# Patient Record
Sex: MALE | Race: OTHER | HISPANIC OR LATINO | URBAN - METROPOLITAN AREA
[De-identification: names, ages, dates, MRNs, and addresses within clinical notes are randomized per-mention and may not be internally consistent; named-entity substitution may affect disease eponyms.]

---

## 2020-01-12 ENCOUNTER — EMERGENCY (EMERGENCY)
Facility: HOSPITAL | Age: 65
LOS: 1 days | Discharge: HOME | End: 2020-01-12
Admitting: EMERGENCY MEDICINE
Payer: MEDICARE

## 2020-01-12 VITALS
TEMPERATURE: 99 F | WEIGHT: 274.92 LBS | DIASTOLIC BLOOD PRESSURE: 109 MMHG | RESPIRATION RATE: 18 BRPM | SYSTOLIC BLOOD PRESSURE: 183 MMHG | HEIGHT: 70 IN | HEART RATE: 100 BPM | OXYGEN SATURATION: 92 %

## 2020-01-12 VITALS
SYSTOLIC BLOOD PRESSURE: 173 MMHG | RESPIRATION RATE: 17 BRPM | HEART RATE: 100 BPM | OXYGEN SATURATION: 95 % | DIASTOLIC BLOOD PRESSURE: 100 MMHG

## 2020-01-12 PROCEDURE — 70360 X-RAY EXAM OF NECK: CPT | Mod: 26

## 2020-01-12 PROCEDURE — 99283 EMERGENCY DEPT VISIT LOW MDM: CPT | Mod: 25

## 2020-01-12 RX ORDER — DIAZEPAM 5 MG
5 TABLET ORAL ONCE
Refills: 0 | Status: DISCONTINUED | OUTPATIENT
Start: 2020-01-12 | End: 2020-01-12

## 2020-01-12 RX ADMIN — Medication 5 MILLIGRAM(S): at 12:15

## 2020-01-12 NOTE — ED ADULT NURSE REASSESSMENT NOTE - NS ED NURSE REASSESS COMMENT FT1
Patient provided with juice for PO challenge, after drinking pt with episode of choking states he feels like the meat is still there. No difficulty breathing. STEVAN Lemos made aware, will reassess.
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
## 2020-01-12 NOTE — ED PROVIDER NOTE - NSFOLLOWUPINSTRUCTIONS_ED_ALL_ED_FT
please chew food and eat in smaller portions with plenty of fluids     please take your blood pressure medication upon your return home.     Choking, Adult  Choking occurs when a food or object gets stuck in the throat or windpipe (trachea) and blocks the airway. If the airway is partly blocked, coughing will usually cause the food or object to come out. If the airway is completely blocked, immediate action is needed to make it come out.  A person who is choking needs immediate help. The kind of treatment you offer depends on the severity of the person's choking. A person has a partial airway blockage if he or she is coughing but is able to speak. A person has a complete airway blockage if he or she:  Is unable to breathe.Is making soft or high-pitched sounds while breathing.Is unable to cough or is coughing weakly, ineffectively, or silently.Is unable to cry, speak, or make sounds.Is turning blue.Is holding the neck with both hands. This is the universal sign of choking.Nods "yes" when asked if he or she is choking.Follow these instructions at home:  For partial airway blockage     If a person has a partial airway blockage and he or she is coughing and is able to speak:  Do not interfere. Allow coughing to clear the airway.Do not let him or her try to drink until the food or object comes out.Stay with the person until the food or object comes out. Watch for signs of choking (complete airway blockage). If the person shows signs of complete airway blockage you should take action to help the person.For complete airway blockage    If a person has a complete airway blockage, his or her life is in danger. Choking causes breathing to stop. This is a medical emergency that requires fast, appropriate action by anyone who is available.  To save a person who is choking:  Encourage the person to cough. If this does not relieve the blockage, go to Step 2.  Perform a Heimlich maneuver. The Heimlich maneuver, also called abdominal thrusts, forces air from the abdomen to go up into the throat to relieve a blockage.  Heimlich maneuver for choking    For a person who is sitting or standing:  Ask the person whether he or she is choking. If the person nods or gives the universal sign of choking, continue to step 2.  Shout for help. If someone responds, tell that person to call local emergency services (371 in the [place] He or she should use a landline, if possible.  Stand or kneel behind the person who is choking and lean him or her forward slightly.  Make a fist with one hand, put your arms around the person's midsection, and grasp your fist with your other hand. Place the thumb side of your fist against the person's abdomen, just below the ribs.  Quickly press inward (toward you) and upward with both hands. Repeat this 3–5 times.  If the person is pregnant or obese and you cannot wrap your arms around the person's midsection, wrap your arms around the chest (under the armpits), place your hands over the breastbone, and do chest thrusts.Repeat this maneuver until the object comes out and the person is able to breathe, or until the person becomes unconscious.  Anyone who has been given the Heimlich maneuver should be seen by a health care provider after the event.  For a person who is unconscious (if the choking person collapses or is found on the ground and you know the person was choking):  Shout for help.  If someone responds, tell that person to call local emergency services (261 in [place] and look for an AED. He or she should use a landline, if possible.If no one responds, call local emergency services (611 in the [place] yourself, if possible.Make sure the person is lying on a firm, flat surface, facing up. Begin CPR, starting with 30 compressions and 2 breaths. Every time you open the airway to give rescue breaths, open the person's mouth. If you can see the food or object and it can be easily pulled out, remove it with your fingers.  After 5 cycles or 2 minutes of CPR, call local emergency services (067 in [place] if a call has not already been made.  Continue CPR until the person starts breathing or until help arrives.  If you are choking:      Call local emergency services (041 in [place] Use a landline if possible. Do not worry about communicating what is happening. Do not hang up the phone. Someone may be sent to help you anyway.  Make a fist with one hand. Put the thumb side of the fist against your abdomen, just above the belly button and well below the breastbone. If you are pregnant or obese, put your fist on your chest instead, just below the breastbone and just above your lowest ribs.  Hold your fist with your other hand and bend over a hard surface, such as a table or chair.  Forcefully push your fist in and up.  Continue to do this until the food or object comes out.  Prevention    Chew food thoroughly.Avoid talking while you are chewing and swallowing.To be prepared if choking occurs, take a certified first-aid course to learn how to correctly perform the Heimlich maneuver.  Contact a health care provider if:  You have trouble swallowing food.Get help right away if you:  Develop a fever after choking stops.Have problems breathing after choking stops.Were given the Heimlich maneuver.Summary  Choking occurs when a food or object gets stuck in the throat (trachea) and blocks the airway.If a person has a partial airway blockage, do not interfere and do not allow the person to drink until the food or object comes out.If a person has a complete airway blockage perform the Heimlich maneuver. Call local emergency services or take the person to a health care provider afterward.If the person is unconscious, call local emergency services and perform CPR until the person breathes normally or until help arrives.This information is not intended to replace advice given to you by your health care provider. Make sure you discuss any questions you have with your health care provider.    Document Released: 01/25/2006 Document Revised: 01/09/2019 Document Reviewed: 01/09/2019  [employer] Interactive Patient Education © 2019 [employer] please chew food and eat in smaller portions with plenty of fluids     please take your blood pressure medication upon your return home.     please follow up with gastroenterologist for the choking you experienced today    Choking, Adult  Choking occurs when a food or object gets stuck in the throat or windpipe (trachea) and blocks the airway. If the airway is partly blocked, coughing will usually cause the food or object to come out. If the airway is completely blocked, immediate action is needed to make it come out.  A person who is choking needs immediate help. The kind of treatment you offer depends on the severity of the person's choking. A person has a partial airway blockage if he or she is coughing but is able to speak. A person has a complete airway blockage if he or she:  Is unable to breathe.Is making soft or high-pitched sounds while breathing.Is unable to cough or is coughing weakly, ineffectively, or silently.Is unable to cry, speak, or make sounds.Is turning blue.Is holding the neck with both hands. This is the universal sign of choking.Nods "yes" when asked if he or she is choking.Follow these instructions at home:  For partial airway blockage     If a person has a partial airway blockage and he or she is coughing and is able to speak:  Do not interfere. Allow coughing to clear the airway.Do not let him or her try to drink until the food or object comes out.Stay with the person until the food or object comes out. Watch for signs of choking (complete airway blockage). If the person shows signs of complete airway blockage you should take action to help the person.For complete airway blockage    If a person has a complete airway blockage, his or her life is in danger. Choking causes breathing to stop. This is a medical emergency that requires fast, appropriate action by anyone who is available.  To save a person who is choking:  Encourage the person to cough. If this does not relieve the blockage, go to Step 2.  Perform a Heimlich maneuver. The Heimlich maneuver, also called abdominal thrusts, forces air from the abdomen to go up into the throat to relieve a blockage.  Heimlich maneuver for choking    For a person who is sitting or standing:  Ask the person whether he or she is choking. If the person nods or gives the universal sign of choking, continue to step 2.  Shout for help. If someone responds, tell that person to call local emergency services (911 in the [place] He or she should use a landline, if possible.  Stand or kneel behind the person who is choking and lean him or her forward slightly.  Make a fist with one hand, put your arms around the person's midsection, and grasp your fist with your other hand. Place the thumb side of your fist against the person's abdomen, just below the ribs.  Quickly press inward (toward you) and upward with both hands. Repeat this 3–5 times.  If the person is pregnant or obese and you cannot wrap your arms around the person's midsection, wrap your arms around the chest (under the armpits), place your hands over the breastbone, and do chest thrusts.Repeat this maneuver until the object comes out and the person is able to breathe, or until the person becomes unconscious.  Anyone who has been given the Heimlich maneuver should be seen by a health care provider after the event.  For a person who is unconscious (if the choking person collapses or is found on the ground and you know the person was choking):  Shout for help.  If someone responds, tell that person to call local emergency services (911 in [place] and look for an AED. He or she should use a landline, if possible.If no one responds, call local emergency services (101 in the [place] yourself, if possible.Make sure the person is lying on a firm, flat surface, facing up. Begin CPR, starting with 30 compressions and 2 breaths. Every time you open the airway to give rescue breaths, open the person's mouth. If you can see the food or object and it can be easily pulled out, remove it with your fingers.  After 5 cycles or 2 minutes of CPR, call local emergency services (341 in [place] if a call has not already been made.  Continue CPR until the person starts breathing or until help arrives.  If you are choking:      Call local emergency services (581 in [place] Use a landline if possible. Do not worry about communicating what is happening. Do not hang up the phone. Someone may be sent to help you anyway.  Make a fist with one hand. Put the thumb side of the fist against your abdomen, just above the belly button and well below the breastbone. If you are pregnant or obese, put your fist on your chest instead, just below the breastbone and just above your lowest ribs.  Hold your fist with your other hand and bend over a hard surface, such as a table or chair.  Forcefully push your fist in and up.  Continue to do this until the food or object comes out.  Prevention    Chew food thoroughly.Avoid talking while you are chewing and swallowing.To be prepared if choking occurs, take a certified first-aid course to learn how to correctly perform the Heimlich maneuver.  Contact a health care provider if:  You have trouble swallowing food.Get help right away if you:  Develop a fever after choking stops.Have problems breathing after choking stops.Were given the Heimlich maneuver.Summary  Choking occurs when a food or object gets stuck in the throat (trachea) and blocks the airway.If a person has a partial airway blockage, do not interfere and do not allow the person to drink until the food or object comes out.If a person has a complete airway blockage perform the Heimlich maneuver. Call local emergency services or take the person to a health care provider afterward.If the person is unconscious, call local emergency services and perform CPR until the person breathes normally or until help arrives.This information is not intended to replace advice given to you by your health care provider. Make sure you discuss any questions you have with your health care provider.    Document Released: 01/25/2006 Document Revised: 01/09/2019 Document Reviewed: 01/09/2019  Elsevier Interactive Patient Education © 2019 [employer]

## 2020-01-12 NOTE — ED PROVIDER NOTE - CARE PROVIDER_API CALL
Varsha Pickering (DO)  Internal Medicine; Preventive Medicine  [address] [place] [address]  Phone: [phone]  Fax: [phone]  Follow Up Time:     Basilio Sanchez; MBBS)  Internal Medicine  [address] [place] [address]  Phone: [phone]  Fax: [phone]  Follow Up Time:

## 2020-01-12 NOTE — ED PROVIDER NOTE - PHYSICAL EXAMINATION
General: Patient is well developed and well nourised. Patient is alert and oriented to person, place and date. Patient is laying comfortably in stretcher and appears in no acute distress.  HEENT: Head is normocephalic and atraumatic. Pupils are equal, round and reactive. Extraocular movements intact. No evidence of nystagmus, conjunctival injection, or scleral icterus. External ears symmetric without evidence of discharge.  Nose is symmetric, non-tender, patent without evidence of discharge. Teeth in good repair. Uvula midline.   Neck: Supple with no evidence of lymphadenopathy.  Full range of motion.  Heart: Regular rate and rhythm. No murmurs, rubs or gallops.   Lungs: Clear to auscultation bilaterally with equal chest expansion. No note of wheezes, rhonchi, rales. Equal chest expansion. No note of retractions.  Abdomen: Bowel sounds present in all four quadrants. Soft, non-tender, non-distended without signs of masses, rebound or guarding. No note of hepatosplenomegaly. No CVA tenderness bilaterally. Negative Hollingsworth sign. No pain present over McBurney's point.  Neuro: 15. Moving all extremities without discomfort. Strength is 5/5 arms and legs bilaterally. Sensation intact in all four extremities. gait steady   Skin: Warm, dry and intact without evidence of rashes, bruising, pallor, jaundice or cyanosis.   Psych: Mood and affect appropriate.
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
## 2020-01-12 NOTE — ED PROVIDER NOTE - CARE PROVIDERS DIRECT ADDRESSES
,DirectAddress_Unknown,[email or web address]
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
## 2020-01-12 NOTE — ED PROVIDER NOTE - PATIENT PORTAL LINK FT
You can access the FollowMyHealth Patient Portal offered by [facility] by registering at the following website: [email or web address]. By joining [facility]’s FollowMyHealth portal, you will also be able to view your health information using other applications (apps) compatible with our system. You can access the FollowMyHealth Patient Portal offered by [facility] by registering at the following website: [email or web address]. By joining [employer]’s FollowMyHealth portal, you will also be able to view your health information using other applications (apps) compatible with our system.

## 2020-01-12 NOTE — ED PROVIDER NOTE - DIAGNOSTIC INTERPRETATION
ER Physician Assistant: reviewed with radiology  INTERPRETATION: no acute fracture; no soft tissue swelling noted; normal bony alignment. no noted foreign body

## 2020-01-14 ENCOUNTER — INPATIENT (INPATIENT)
Facility: HOSPITAL | Age: 65
LOS: 1 days | Discharge: HOME | DRG: 395 | End: 2020-01-16
Attending: HOSPITALIST | Admitting: HOSPITALIST
Payer: MEDICARE

## 2020-01-14 VITALS
WEIGHT: 268.08 LBS | DIASTOLIC BLOOD PRESSURE: 138 MMHG | RESPIRATION RATE: 16 BRPM | OXYGEN SATURATION: 94 % | HEART RATE: 98 BPM | HEIGHT: 70 IN | TEMPERATURE: 98 F | SYSTOLIC BLOOD PRESSURE: 206 MMHG

## 2020-01-14 DIAGNOSIS — Z91.89 OTHER SPECIFIED PERSONAL RISK FACTORS, NOT ELSEWHERE CLASSIFIED: ICD-10-CM

## 2020-01-14 DIAGNOSIS — R13.10 DYSPHAGIA, UNSPECIFIED: ICD-10-CM

## 2020-01-14 DIAGNOSIS — G89.29 OTHER CHRONIC PAIN: ICD-10-CM

## 2020-01-14 DIAGNOSIS — I10 ESSENTIAL (PRIMARY) HYPERTENSION: ICD-10-CM

## 2020-01-14 DIAGNOSIS — I16.0 HYPERTENSIVE URGENCY: ICD-10-CM

## 2020-01-14 DIAGNOSIS — R63.8 OTHER SYMPTOMS AND SIGNS CONCERNING FOOD AND FLUID INTAKE: ICD-10-CM

## 2020-01-14 DIAGNOSIS — R94.31 ABNORMAL ELECTROCARDIOGRAM [ECG] [EKG]: ICD-10-CM

## 2020-01-14 LAB
ALBUMIN SERPL ELPH-MCNC: 4.2 G/DL — SIGNIFICANT CHANGE UP (ref 3.3–5)
ALP SERPL-CCNC: 100 U/L — SIGNIFICANT CHANGE UP (ref 40–120)
ALT FLD-CCNC: 17 U/L — SIGNIFICANT CHANGE UP (ref 10–45)
ANION GAP SERPL CALC-SCNC: 17 MMOL/L — SIGNIFICANT CHANGE UP (ref 5–17)
AST SERPL-CCNC: 31 U/L — SIGNIFICANT CHANGE UP (ref 10–40)
BASOPHILS # BLD AUTO: 0.06 K/UL — SIGNIFICANT CHANGE UP (ref 0–0.2)
BASOPHILS NFR BLD AUTO: 0.5 % — SIGNIFICANT CHANGE UP (ref 0–2)
BILIRUB SERPL-MCNC: 0.8 MG/DL — SIGNIFICANT CHANGE UP (ref 0.2–1.2)
BUN SERPL-MCNC: 9 MG/DL — SIGNIFICANT CHANGE UP (ref 7–23)
CALCIUM SERPL-MCNC: 9.4 MG/DL — SIGNIFICANT CHANGE UP (ref 8.4–10.5)
CHLORIDE SERPL-SCNC: 103 MMOL/L — SIGNIFICANT CHANGE UP (ref 96–108)
CK MB CFR SERPL CALC: 8.6 NG/ML — HIGH (ref 0–6.7)
CK MB CFR SERPL CALC: 9.1 NG/ML — HIGH (ref 0–6.7)
CK SERPL-CCNC: 729 U/L — HIGH (ref 30–200)
CK SERPL-CCNC: 877 U/L — HIGH (ref 30–200)
CO2 SERPL-SCNC: 24 MMOL/L — SIGNIFICANT CHANGE UP (ref 22–31)
CREAT SERPL-MCNC: 0.8 MG/DL — SIGNIFICANT CHANGE UP (ref 0.5–1.3)
EOSINOPHIL # BLD AUTO: 0.45 K/UL — SIGNIFICANT CHANGE UP (ref 0–0.5)
EOSINOPHIL NFR BLD AUTO: 3.7 % — SIGNIFICANT CHANGE UP (ref 0–6)
GLUCOSE BLDC GLUCOMTR-MCNC: 84 MG/DL — SIGNIFICANT CHANGE UP (ref 70–99)
GLUCOSE SERPL-MCNC: 99 MG/DL — SIGNIFICANT CHANGE UP (ref 70–99)
HCT VFR BLD CALC: 48.2 % — SIGNIFICANT CHANGE UP (ref 39–50)
HGB BLD-MCNC: 15.2 G/DL — SIGNIFICANT CHANGE UP (ref 13–17)
IMM GRANULOCYTES NFR BLD AUTO: 0.6 % — SIGNIFICANT CHANGE UP (ref 0–1.5)
LYMPHOCYTES # BLD AUTO: 1.67 K/UL — SIGNIFICANT CHANGE UP (ref 1–3.3)
LYMPHOCYTES # BLD AUTO: 13.8 % — SIGNIFICANT CHANGE UP (ref 13–44)
MCHC RBC-ENTMCNC: 30 PG — SIGNIFICANT CHANGE UP (ref 27–34)
MCHC RBC-ENTMCNC: 31.5 GM/DL — LOW (ref 32–36)
MCV RBC AUTO: 95.1 FL — SIGNIFICANT CHANGE UP (ref 80–100)
MONOCYTES # BLD AUTO: 0.97 K/UL — HIGH (ref 0–0.9)
MONOCYTES NFR BLD AUTO: 8 % — SIGNIFICANT CHANGE UP (ref 2–14)
NEUTROPHILS # BLD AUTO: 8.91 K/UL — HIGH (ref 1.8–7.4)
NEUTROPHILS NFR BLD AUTO: 73.4 % — SIGNIFICANT CHANGE UP (ref 43–77)
NRBC # BLD: 0 /100 WBCS — SIGNIFICANT CHANGE UP (ref 0–0)
PLATELET # BLD AUTO: 196 K/UL — SIGNIFICANT CHANGE UP (ref 150–400)
POTASSIUM SERPL-MCNC: 4.4 MMOL/L — SIGNIFICANT CHANGE UP (ref 3.5–5.3)
POTASSIUM SERPL-SCNC: 4.4 MMOL/L — SIGNIFICANT CHANGE UP (ref 3.5–5.3)
PROT SERPL-MCNC: 8.2 G/DL — SIGNIFICANT CHANGE UP (ref 6–8.3)
RBC # BLD: 5.07 M/UL — SIGNIFICANT CHANGE UP (ref 4.2–5.8)
RBC # FLD: 12.9 % — SIGNIFICANT CHANGE UP (ref 10.3–14.5)
SODIUM SERPL-SCNC: 144 MMOL/L — SIGNIFICANT CHANGE UP (ref 135–145)
TROPONIN T SERPL-MCNC: 0.01 NG/ML — SIGNIFICANT CHANGE UP (ref 0–0.01)
TROPONIN T SERPL-MCNC: 0.02 NG/ML — HIGH (ref 0–0.01)
WBC # BLD: 12.13 K/UL — HIGH (ref 3.8–10.5)
WBC # FLD AUTO: 12.13 K/UL — HIGH (ref 3.8–10.5)

## 2020-01-14 PROCEDURE — 99285 EMERGENCY DEPT VISIT HI MDM: CPT

## 2020-01-14 PROCEDURE — 70490 CT SOFT TISSUE NECK W/O DYE: CPT | Mod: 26

## 2020-01-14 PROCEDURE — 99222 1ST HOSP IP/OBS MODERATE 55: CPT | Mod: GC

## 2020-01-14 RX ORDER — GLUCAGON INJECTION, SOLUTION 0.5 MG/.1ML
0.5 INJECTION, SOLUTION SUBCUTANEOUS ONCE
Refills: 0 | Status: COMPLETED | OUTPATIENT
Start: 2020-01-14 | End: 2020-01-14

## 2020-01-14 RX ORDER — HYDRALAZINE HCL 50 MG
10 TABLET ORAL ONCE
Refills: 0 | Status: DISCONTINUED | OUTPATIENT
Start: 2020-01-14 | End: 2020-01-14

## 2020-01-14 RX ORDER — PANTOPRAZOLE SODIUM 20 MG/1
40 TABLET, DELAYED RELEASE ORAL EVERY 12 HOURS
Refills: 0 | Status: DISCONTINUED | OUTPATIENT
Start: 2020-01-14 | End: 2020-01-16

## 2020-01-14 RX ORDER — LABETALOL HCL 100 MG
20 TABLET ORAL ONCE
Refills: 0 | Status: DISCONTINUED | OUTPATIENT
Start: 2020-01-14 | End: 2020-01-14

## 2020-01-14 RX ORDER — ONDANSETRON 8 MG/1
4 TABLET, FILM COATED ORAL EVERY 8 HOURS
Refills: 0 | Status: DISCONTINUED | OUTPATIENT
Start: 2020-01-14 | End: 2020-01-16

## 2020-01-14 RX ORDER — ONDANSETRON 8 MG/1
4 TABLET, FILM COATED ORAL ONCE
Refills: 0 | Status: COMPLETED | OUTPATIENT
Start: 2020-01-14 | End: 2020-01-14

## 2020-01-14 RX ORDER — METHADONE HYDROCHLORIDE 40 MG/1
5 TABLET ORAL EVERY 8 HOURS
Refills: 0 | Status: DISCONTINUED | OUTPATIENT
Start: 2020-01-14 | End: 2020-01-15

## 2020-01-14 RX ADMIN — PANTOPRAZOLE SODIUM 40 MILLIGRAM(S): 20 TABLET, DELAYED RELEASE ORAL at 22:30

## 2020-01-14 RX ADMIN — GLUCAGON INJECTION, SOLUTION 0.5 MILLIGRAM(S): 0.5 INJECTION, SOLUTION SUBCUTANEOUS at 17:32

## 2020-01-14 RX ADMIN — ONDANSETRON 4 MILLIGRAM(S): 8 TABLET, FILM COATED ORAL at 17:32
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
## 2020-01-14 NOTE — H&P ADULT - NSHPLABSRESULTS_GEN_ALL_CORE
.  LABS:                         15.2   12.13 )-----------( 196      ( 14 Jan 2020 15:02 )             48.2     01-14    144  |  103  |  9   ----------------------------<  99  4.4   |  24  |  0.80    Ca    9.4      14 Jan 2020 15:02    TPro  8.2  /  Alb  4.2  /  TBili  0.8  /  DBili  x   /  AST  31  /  ALT  17  /  AlkPhos  100  01-14        RADIOLOGY, EKG & ADDITIONAL TESTS: Reviewed.     Xray Neck Soft Tissue (01.12.20 @ 12:47)   IMPRESSION: No radiopaque foreign body.    CT Neck Soft Tissue No Cont (01.14.20 @ 16:43)   IMPRESSION:  No foreign body identified in the upper aerodigestive tract.    EKG: NSR, normal axis, TWI in inferior leads, Q waves V1-3

## 2020-01-14 NOTE — H&P ADULT - PROBLEM SELECTOR PLAN 1
Two day history of dysphagia with sensation of food stuck in the throat after eating pork chops. Negative ENT scope, negative Xray ad CT imaging. No concerning features on exam, as patient is able to handle secretions, and in no respiratory distress.   - GI consulted, plan for EGD  - NPO after MN, coags and T&S  - Zofran PRN for nausea  - hold off on maintenance fluids for now given hypertension  - RCRI Score 0% (1% if has congestive heart failure)  - No hx of adverse reaction to anesthesia Two day history of dysphagia with sensation of food stuck in the throat after eating pork chops. Negative ENT scope, negative Xray ad CT imaging. No concerning features on exam, as patient is able to handle secretions, and in no respiratory distress.   - GI consulted, plan for EGD tomorrow  - PPI IV BID  - NPO after MN, coags and T&S  - Zofran PRN for nausea  - rule out external compression from aortic dissection or aneurysm with urgent CT Angio chest dissection protocol  - BP measurements in both arms  - RCRI Score 0% (1% if has congestive heart failure)  - No hx of adverse reaction to anesthesia

## 2020-01-14 NOTE — ED PROVIDER NOTE - PHYSICAL EXAMINATION
L anterior neck swelling, soft, non-tender, no overlying skin changes - pt states this is chronic and unchanged.  PT points to area just superior and to the right of suprasternal notch as area of FB sensation.  normal swallowing.  no stridor/drooling/voice changes.   LUE contracted/weak (chronic)  steady gait w/ cane like at baseline

## 2020-01-14 NOTE — CONSULT NOTE ADULT - ASSESSMENT
65 yo M with HTN, chronic pain, on methadone who comes in with two day history of dysphagia. 65 yo M with HTN, chronic pain, on methadone who comes in with two day history of dysphagia  -CT neck negative. Pt able to tolerate secretions and drink water at bedside   -Differential includes web vs stricture vs esophagitis. No alarm features to suggest carcinoma. Motility etiologies such as achalasia less likely given no prior history of dysphagia.   I-Recommend IV PPI BID  -Keep NPO, with plan for EGD tomorrow to further evaluate    Discussed case with Dr. Hernandez 63 yo M with HTN, chronic pain, on methadone who comes in with two day history of dysphagia  -CT neck negative. Pt able to tolerate secretions and drink water at bedside  -Differential includes web vs stricture vs esophagitis. No alarm features to suggest carcinoma. Motility etiologies such as achalasia less likely given no prior history of dysphagia.   -Recommend IV PPI BID  -Keep NPO, with plan for EGD tomorrow to further evaluate    Discussed case with Dr. Hernandez

## 2020-01-14 NOTE — H&P ADULT - PROBLEM SELECTOR PLAN 6
F: None given hypertension  E: replete K<4, Mg<2  N: NPO    VTE Prophylaxis: None needed  C: Full Code  D: [facility]

## 2020-01-14 NOTE — H&P ADULT - PROBLEM SELECTOR PLAN 4
F: None given hypertension  E: replete K<4, Mg<2  N: NPO    VTE Prophylaxis: None needed  C: Full Code  D: [facility] EKG with TWI in inferior leads and Q waves in V1-3. There is no prior EKG for comparison.  Also with elevation in cardiac enzymes.  Sensation of dyspepsia and food stuck in chest may be cardiac equivalent, however, patient has not experienced chest pressure, SOB, orthopnea, decreased exercise tolerance, LE edema or any other concerning cardiac symptoms in the last few days.   - Serial EKG and cardiac enzymes, next 10pm  - obtain collateral from cardiologist at [facility] (medications should be Rx by this physician from pharmacy)

## 2020-01-14 NOTE — H&P ADULT - NSHPPHYSICALEXAM_GEN_ALL_CORE
.  VITAL SIGNS:  T(F): 98.2 (01-14-20 @ 19:16), Max: 98.2 (01-14-20 @ 19:16)  HR: 99 (01-14-20 @ 19:16) (89 - 99)  BP: 154/95 (01-14-20 @ 19:16) (154/95 - 206/138)  BP(mean): --  RR: 16 (01-14-20 @ 19:16) (16 - 16)  SpO2: 93% (01-14-20 @ 19:16) (93% - 94%)    PHYSICAL EXAM:    Constitutional: WDWN resting comfortably in bed; NAD  HEENT: NC/AT, PERRL, EOMI, anicteric sclera, no nasal discharge; uvula midline, no oropharyngeal erythema or exudates; MMM  Neck: supple; no JVD or thyromegaly  Respiratory: CTA B/L; no W/R/R, no retractions  Cardiac: +S1/S2; RRR; no M/R/G; PMI non-displaced  Gastrointestinal: soft, NT/ND; no rebound or guarding; +BSx4  Back: spine midline, no bony tenderness or step-offs; no CVAT B/L  Extremities: WWP, no clubbing or cyanosis; no peripheral edema  Musculoskeletal: NROM x4; no joint swelling, tenderness or erythema  Vascular: 2+ radial, femoral, DP/PT pulses B/L  Dermatologic: skin warm, dry and intact; no rashes, wounds, or scars  Lymphatic: no submandibular or cervical LAD  Neurologic: AAOx3; CNII-XII grossly intact; no focal deficits  Psychiatric: affect and characteristics of appearance, verbalizations, behaviors are appropriate, denies SI/HI/AH/VH .  VITAL SIGNS:  T(F): 98.2 (01-14-20 @ 19:16), Max: 98.2 (01-14-20 @ 19:16)  HR: 99 (01-14-20 @ 19:16) (89 - 99)  BP: 154/95 (01-14-20 @ 19:16) (154/95 - 206/138)  RR: 16 (01-14-20 @ 19:16) (16 - 16)  SpO2: 93% (01-14-20 @ 19:16) (93% - 94%)    PHYSICAL EXAM:  Constitutional: Obese, resting comfortably in bed, NAD, no gargling, no resp distress  HEENT: NC/AT, PERRL, EOMI, edentulous, no oropharyngeal erythema or exudates, dry tongue, no pooling of secretions  Neck: swelling of the left anterior neck (chronic), likely fatty growth, supple; no thyromegaly or masses  Respiratory: CTA B/L; no W/R/R, no stridor, speaking in full sentences, no use of accessory muscles  Cardiac: +S1/S2; RRR; no M/R/G  Gastrointestinal: obese, soft, NT/ND, no rebound or guarding; +BSx4  Extremities: trace pitting edema b/l LE, WWP, no clubbing or cyanosis; LUE with large scar prox and distal to elbow with contracture of extremity and sarcopenia   Vascular: 2+ radial, DP/PT pulses B/L  Dermatologic: skin warm, dry and intact; no rashes, wounds, or scars  Lymphatic: no submandibular or cervical LAD  Neurologic: AAOx3; normal tonation of voice, no slurred words, CNII-XII grossly intact; no focal deficits

## 2020-01-14 NOTE — H&P ADULT - PROBLEM SELECTOR PLAN 3
EKG with TWI in inferior leads and Q waves in V1-3. There is no prior EKG for comparison.  Sensation of dyspepsia and food stuck in chest may be cardiac equivalent, however, patient has not experienced chest pressure, SOB, orthopnea, decreased exercise tolerance, LE edema or any other concerning cardiac symptoms in the last few days.   - obtain cardiac enzymes now  - Repeat EKG in the AM  - obtain collateral from cardiologist at [facility] (medications should be Rx by this physician from pharmacy) EKG with TWI in inferior leads and Q waves in V1-3. There is no prior EKG for comparison.  Also with elevation in cardiac enzymes.  Sensation of dyspepsia and food stuck in chest may be cardiac equivalent, however, patient has not experienced chest pressure, SOB, orthopnea, decreased exercise tolerance, LE edema or any other concerning cardiac symptoms in the last few days.   - Serial EKG and cardiac enzymes, next 10pm  - obtain collateral from cardiologist at [facility] (medications should be Rx by this physician from pharmacy) Hx of HTN on 3 different medications at home, for which patient cannot recall. Hypertensive to SBP 200s in the ED likely in the setting of missing 2-3 days of medications 2/2 inability to tolerate PO.  - Pharmacy closed, obtain medical reconciliation in AM (pharmacy listed above)  - given Hydralazine 10mg IV once, reassess after dose   - may require additional IVP Hydralazine overnight  - avoid any PO medications for now Hx of HTN on 3 different medications at home, for which patient cannot recall. Hypertensive to SBP 200s in the ED likely in the setting of missing 2-3 days of medications 2/2 inability to tolerate PO.  - Pharmacy closed, obtain medical reconciliation in AM (pharmacy listed above)  - given Hydralazine 10mg IV once  - Additional IVP of labetalol 20mg overnight for BP>180/110  - avoid any PO medications for now

## 2020-01-14 NOTE — H&P ADULT - PROBLEM SELECTOR PLAN 2
Hx of HTN on 3 different medications at home, for which patient cannot recall. Hypertensive to SBP 200s in the ED likely in the setting of missing 2-3 days of medications 2/2 inability to tolerate PO.  - Pharmacy closed, obtain medical reconciliation in AM (pharmacy listed above)  - given Hydralazine 10mg IV once, reassess after dose   - may require additional IVP Hydralazine overnight  - avoid any PO medications for now HTN to BP >200/110 with troponin leak and EKG changes.   - BP goal approximately 150/90 in next 24 hours  - see plan below for hypertension management  - serial cardiac enzymes and EKGs

## 2020-01-14 NOTE — ED ADULT TRIAGE NOTE - OTHER COMPLAINTS
patient reports FB in throat x 2 days, was evaluated here and discharged--reports ongoing pain with swallowing--speaking in full, complete sentences, no drooling noted, denies voice change--reports has been unable to take HTN medications/tolerate PO "because I throw it up right after"--denies CP

## 2020-01-14 NOTE — H&P ADULT - NSHPOUTPATIENTPROVIDERS_GEN_ALL_CORE
Receives all care through [facility] (cannot recall physician names)  Pharmacy: [facility] [phone]
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
## 2020-01-14 NOTE — H&P ADULT - PROBLEM SELECTOR PLAN 5
1) PCP Contacted on Admission: (Y/N) --> Name & Phone #:  2) Date of Contact with PCP:  3) PCP Contacted at Discharge: (Y/N)  4) Summary of Handoff Given to PCP:   5) Post-Discharge Appointment Date and Location: Chronic Pain in LUE 2/2 traumatic injury. On methadone 125mg daily.  - patient has card in possession, has to find to confirm dose  - continue Methadone IV (equivalent of 40mg PO until dose confirmed)  - switch to PO once tolerating

## 2020-01-14 NOTE — CONSULT NOTE ADULT - SUBJECTIVE AND OBJECTIVE BOX
HPI: 64M presents to ED with dysphagia and globus sensation x 2 days. Patient reports he was eating pork chops 2 days ago when symptoms began. He reportedly went to ED and was found to have unremarkable XR. He was discharged and symptomatically improved until today when he again developed dysphagia and inability to tolerate PO. Denies SOB, changes in voice, fevers, chills.    Allergies    No Known Allergies    Intolerances        PAST MEDICAL & SURGICAL HISTORY:  Paralysis of upper extremity: s/p injury  HTN (hypertension)      MEDICATIONS:  Antiinfectives:       Hematologic/Anticoagulation:      Pain medications/Neuro:  methadone Injectable 5 milliGRAM(s) IV Push every 8 hours  ondansetron Injectable 4 milliGRAM(s) IV Push every 8 hours PRN      IV fluids:      Endocrine Medications:       All other standing medications:   pantoprazole  Injectable 40 milliGRAM(s) IV Push every 12 hours      All other PRN medications:      Vital Signs Last 24 Hrs  T(C): 37.2 (14 Jan 2020 21:32), Max: 37.2 (14 Jan 2020 21:32)  T(F): 98.9 (14 Jan 2020 21:32), Max: 98.9 (14 Jan 2020 21:32)  HR: 94 (14 Jan 2020 21:32) (89 - 99)  BP: 157/89 (14 Jan 2020 21:33) (145/95 - 206/138)  BP(mean): 112 (14 Jan 2020 21:33) (112 - 112)  RR: 22 (14 Jan 2020 21:32) (16 - 22)  SpO2: 92% (14 Jan 2020 21:32) (92% - 94%)    LABS:  CBC-    01-14    144  |  103  |  9   ----------------------------<  99  4.4   |  24  |  0.80    Ca    9.4      14 Jan 2020 15:02    TPro  8.2  /  Alb  4.2  /  TBili  0.8  /  DBili  x   /  AST  31  /  ALT  17  /  AlkPhos  100  01-14    Coagulation Studies-    Endocrine Panel-  --  --  9.4 mg/dL        PHYSICAL EXAM:    Constitutional: Well-developed, well-nourished.  No hoarseness.     Neuro/Psych:  A&O x 3.  Mood stable.    Pulm:  No dyspnea, non-labored breathing  Cardiovascular: NSR on monitor.  Skin:  No rash or lesions on exposed skin of head/neck    Laryngoscopy Findings:   LARYNGOSCOPY EXAM:     -Verbal consent was obtained from patient prior to procedure.    Indication:    Anesthesia: Afrin spray was applied to the nasal cavities.    Flexible laryngoscopy was performed and revealed the following:    -- Nasopharynx had no mass or exudate.    -- Base of tongue was symmetric and not enlarged.    -- Vallecula was clear    -- Epiglottis, both aryepiglottic folds and both false vocal folds were normal    -- Arytenoids both without edema and erythema     -- True vocal folds were fully mobile and without lesions.     -- Post cricoid area was clear.    -- Interarytenoid edema was absent    The patient tolerated the procedure well.    RADIOLOGY & ADDITIONAL STUDIES:

## 2020-01-14 NOTE — ED ADULT NURSE REASSESSMENT NOTE - NS ED NURSE REASSESS COMMENT FT1
pt still c/o difficulty swallowing, but tolerating small sips of ginger ale, no resp distress noted.

## 2020-01-14 NOTE — CONSULT NOTE ADULT - SUBJECTIVE AND OBJECTIVE BOX
HPI:  65 yo M with HTN, chronic pain, on methadone who comes in with two day history of dysphagia. Pt came to the ER two days ago with dysphagia which developed after eating pork chops. In the ER, he had unremarkable Xray. He tolerated po and left. Since being home, pt reports that he still has sensation that something is stuck in his throat. He has been unable to tolerate po or liquids.     He denies any previous history of dysphagia. He has never had EGD before. He denies abd pain, nausea, vomiting. He is passing flatus and having normal BMs, last of which was yesterday.     In ER, pt was evaluated by ENT - negative scope.      Allergies    No Known Allergies    Intolerances      Home Medications:  aspirin 81 mg oral tablet: 1 tab(s) orally once a day (14 Jan 2020 15:14)  atorvastatin 20 mg oral tablet: 1 tab(s) orally once a day (14 Jan 2020 15:14)  carvedilol 25 mg oral tablet: 1 tab(s) orally 2 times a day (14 Jan 2020 15:14)  quinapril 40 mg oral tablet: 1 tab(s) orally once a day (14 Jan 2020 15:14)    MEDICATIONS:  MEDICATIONS  (STANDING):    MEDICATIONS  (PRN):    PAST MEDICAL & SURGICAL HISTORY:  No pertinent past medical history  No significant past surgical history    FAMILY HISTORY:  No pertinent family history in first degree relatives    SOCIAL HISTORY:  Tobacoo: denies  Alcohol: denies  Illicit Drugs: denies     REVIEW OF SYSTEMS:  CONSTITUTIONAL: No weakness, fevers or chills  HEENT: No visual changes; No vertigo or throat pain   NECK: No pain or stiffness  RESPIRATORY: No cough, wheezing, hemoptysis; No shortness of breath  CARDIOVASCULAR: No chest pain or palpitations  GASTROINTESTINAL: + dysphagia   GENITOURINARY: No dysuria, frequency or hematuria  NEUROLOGICAL: No numbness or weakness  SKIN: No itching, burning, rashes, or lesions   All other 10 review of systems is negative unless indicated above.    Vital Signs Last 24 Hrs  T(C): 36.7 (14 Jan 2020 14:21), Max: 36.7 (14 Jan 2020 14:21)  T(F): 98.1 (14 Jan 2020 14:21), Max: 98.1 (14 Jan 2020 14:21)  HR: 89 (14 Jan 2020 15:42) (89 - 98)  BP: 172/102 (14 Jan 2020 15:42) (172/102 - 206/138)  BP(mean): --  RR: 16 (14 Jan 2020 15:42) (16 - 16)  SpO2: 94% (14 Jan 2020 15:42) (94% - 94%)      PHYSICAL EXAM:    General: Well developed; well nourished; in no acute distress, speaking in full sentences, no drooling. Able to drink water at bedside. Seen walking the halls.   Eyes: Anicteric sclerae, moist conjunctivae  HENT: Moist mucous membranes  Neck: Trachea midline, supple  Lungs: Normal respiratory effors and no intercostal retractions  Cardiovascular: RRR  Abdomen: Soft, non-tender non-distended; Normal bowel sounds; No rebound or guarding  Extremities: Normal range of motion, No clubbing, cyanosis or edema  Neurological: Alert and oriented x3  Skin: Warm and dry. No obvious rash    LABS:                        15.2   12.13 )-----------( 196      ( 14 Jan 2020 15:02 )             48.2     01-14    144  |  103  |  9   ----------------------------<  99  4.4   |  24  |  0.80    Ca    9.4      14 Jan 2020 15:02    TPro  8.2  /  Alb  4.2  /  TBili  0.8  /  DBili  x   /  AST  31  /  ALT  17  /  AlkPhos  100  01-14            RADIOLOGY & ADDITIONAL STUDIES: HPI:  63 yo M with HTN, chronic pain, on methadone who comes in with two day history of dysphagia. Pt came to the ER two days ago with dysphagia which developed after eating pork chops. In the ER, he had unremarkable Xray. He tolerated po and left. Since being home, pt reports that he still has sensation that something is stuck in his throat and he has been unable to tolerate po or liquids while at home, prompting him to return to ER.      He denies any previous history of dysphagia. He has never had EGD before. He denies abd pain, nausea, vomiting. He is passing flatus and having normal BMs, last of which was yesterday.     In ER, received glucagon and pt was evaluated by ENT - negative scope.      Allergies    No Known Allergies    Intolerances      Home Medications:  aspirin 81 mg oral tablet: 1 tab(s) orally once a day (14 Jan 2020 15:14)  atorvastatin 20 mg oral tablet: 1 tab(s) orally once a day (14 Jan 2020 15:14)  carvedilol 25 mg oral tablet: 1 tab(s) orally 2 times a day (14 Jan 2020 15:14)  quinapril 40 mg oral tablet: 1 tab(s) orally once a day (14 Jan 2020 15:14)    MEDICATIONS:  MEDICATIONS  (STANDING):    MEDICATIONS  (PRN):    PAST MEDICAL & SURGICAL HISTORY:  No pertinent past medical history  No significant past surgical history    FAMILY HISTORY:  No pertinent family history in first degree relatives    SOCIAL HISTORY:  Tobacoo: denies  Alcohol: denies  Illicit Drugs: denies     REVIEW OF SYSTEMS:  CONSTITUTIONAL: No weakness, fevers or chills  HEENT: No visual changes; No vertigo or throat pain   NECK: No pain or stiffness  RESPIRATORY: No cough, wheezing, hemoptysis; No shortness of breath  CARDIOVASCULAR: No chest pain or palpitations  GASTROINTESTINAL: + dysphagia   GENITOURINARY: No dysuria, frequency or hematuria  NEUROLOGICAL: No numbness or weakness  SKIN: No itching, burning, rashes, or lesions   All other 10 review of systems is negative unless indicated above.    Vital Signs Last 24 Hrs  T(C): 36.7 (14 Jan 2020 14:21), Max: 36.7 (14 Jan 2020 14:21)  T(F): 98.1 (14 Jan 2020 14:21), Max: 98.1 (14 Jan 2020 14:21)  HR: 89 (14 Jan 2020 15:42) (89 - 98)  BP: 172/102 (14 Jan 2020 15:42) (172/102 - 206/138)  BP(mean): --  RR: 16 (14 Jan 2020 15:42) (16 - 16)  SpO2: 94% (14 Jan 2020 15:42) (94% - 94%)      PHYSICAL EXAM:    General: Well developed; well nourished; in no acute distress, speaking in full sentences, no drooling. Able to drink water at bedside. Seen walking the halls.   Eyes: Anicteric sclerae, moist conjunctivae  HENT: Moist mucous membranes  Neck: Trachea midline, supple  Lungs: Normal respiratory effors and no intercostal retractions  Cardiovascular: RRR  Abdomen: Soft, non-tender non-distended; Normal bowel sounds; No rebound or guarding  Extremities: Normal range of motion, No clubbing, cyanosis or edema  Neurological: Alert and oriented x3  Skin: Warm and dry. No obvious rash    LABS:                        15.2   12.13 )-----------( 196      ( 14 Jan 2020 15:02 )             48.2     01-14    144  |  103  |  9   ----------------------------<  99  4.4   |  24  |  0.80    Ca    9.4      14 Jan 2020 15:02    TPro  8.2  /  Alb  4.2  /  TBili  0.8  /  DBili  x   /  AST  31  /  ALT  17  /  AlkPhos  100  01-14            RADIOLOGY & ADDITIONAL STUDIES:

## 2020-01-14 NOTE — ED PROVIDER NOTE - PROGRESS NOTE DETAILS
[place]: glucagon w/o relief. ENT w/ no FB seen. CT w/ no FB seen. Pt however still feels like something stuck and not tolerating po - will give sips of water, able to swallow but then appears like he regurgitates the water. d/w GI fellow, will admit for likely EGD. updated pt.

## 2020-01-14 NOTE — H&P ADULT - NSHPSOCIALHISTORY_GEN_ALL_CORE
Tobacco use: Former smoker 60 pack years, quit 1 year ago  ETOH use: Denies  Illicit drug use: Denies

## 2020-01-14 NOTE — CONSULT NOTE ADULT - ASSESSMENT
Assessment/Plan:  64y M who presents with 2 days of dysphagia and globus sensation. Exam significant for no foreign body visualized in larynx.    - No acute ENT intervention at this time  - Agree with CT neck and possible GI consult for EGD  - Case discussed with chief resident and ED provider     Page ENT at [phone] with any questions/concerns.

## 2020-01-14 NOTE — ED ADULT NURSE NOTE - CHPI ED NUR SYMPTOMS NEG
no vomiting/no chills/no weakness/no loss of consciousness/no fever/no nausea/no numbness/no syncope/no bleeding gums

## 2020-01-14 NOTE — H&P ADULT - NSICDXPASTMEDICALHX_GEN_ALL_CORE_FT
PAST MEDICAL HISTORY:  No pertinent past medical history PAST MEDICAL HISTORY:  HTN (hypertension)     Paralysis of upper extremity s/p injury
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
## 2020-01-14 NOTE — ED ADULT NURSE NOTE - OBJECTIVE STATEMENT
pt with c/o FB sensation in throat x 2 days, now vomiting when eating. no airway obstruction, breath sounds clear.

## 2020-01-15 ENCOUNTER — TRANSCRIPTION ENCOUNTER (OUTPATIENT)
Age: 65
End: 2020-01-15

## 2020-01-15 ENCOUNTER — RESULT REVIEW (OUTPATIENT)
Age: 65
End: 2020-01-15

## 2020-01-15 DIAGNOSIS — E66.9 OBESITY, UNSPECIFIED: ICD-10-CM

## 2020-01-15 LAB
ANION GAP SERPL CALC-SCNC: 14 MMOL/L — SIGNIFICANT CHANGE UP (ref 5–17)
APTT BLD: 33.3 SEC — SIGNIFICANT CHANGE UP (ref 27.5–36.3)
BLD GP AB SCN SERPL QL: NEGATIVE — SIGNIFICANT CHANGE UP
BUN SERPL-MCNC: 12 MG/DL — SIGNIFICANT CHANGE UP (ref 7–23)
CALCIUM SERPL-MCNC: 8.8 MG/DL — SIGNIFICANT CHANGE UP (ref 8.4–10.5)
CHLORIDE SERPL-SCNC: 106 MMOL/L — SIGNIFICANT CHANGE UP (ref 96–108)
CK MB CFR SERPL CALC: 7.6 NG/ML — HIGH (ref 0–6.7)
CK SERPL-CCNC: 742 U/L — HIGH (ref 30–200)
CO2 SERPL-SCNC: 25 MMOL/L — SIGNIFICANT CHANGE UP (ref 22–31)
CREAT SERPL-MCNC: 0.9 MG/DL — SIGNIFICANT CHANGE UP (ref 0.5–1.3)
GLUCOSE SERPL-MCNC: 78 MG/DL — SIGNIFICANT CHANGE UP (ref 70–99)
HCT VFR BLD CALC: 42.1 % — SIGNIFICANT CHANGE UP (ref 39–50)
HCV AB S/CO SERPL IA: 17.35 S/CO — SIGNIFICANT CHANGE UP
HCV AB SERPL-IMP: REACTIVE
HGB BLD-MCNC: 13.6 G/DL — SIGNIFICANT CHANGE UP (ref 13–17)
INR BLD: 1.16 — SIGNIFICANT CHANGE UP (ref 0.88–1.16)
MAGNESIUM SERPL-MCNC: 2.2 MG/DL — SIGNIFICANT CHANGE UP (ref 1.6–2.6)
MCHC RBC-ENTMCNC: 31.2 PG — SIGNIFICANT CHANGE UP (ref 27–34)
MCHC RBC-ENTMCNC: 32.3 GM/DL — SIGNIFICANT CHANGE UP (ref 32–36)
MCV RBC AUTO: 96.6 FL — SIGNIFICANT CHANGE UP (ref 80–100)
NRBC # BLD: 0 /100 WBCS — SIGNIFICANT CHANGE UP (ref 0–0)
PLATELET # BLD AUTO: 146 K/UL — LOW (ref 150–400)
POTASSIUM SERPL-MCNC: 4.2 MMOL/L — SIGNIFICANT CHANGE UP (ref 3.5–5.3)
POTASSIUM SERPL-SCNC: 4.2 MMOL/L — SIGNIFICANT CHANGE UP (ref 3.5–5.3)
PROTHROM AB SERPL-ACNC: 13.3 SEC — HIGH (ref 10–12.9)
RBC # BLD: 4.36 M/UL — SIGNIFICANT CHANGE UP (ref 4.2–5.8)
RBC # FLD: 13.1 % — SIGNIFICANT CHANGE UP (ref 10.3–14.5)
RH IG SCN BLD-IMP: POSITIVE — SIGNIFICANT CHANGE UP
SODIUM SERPL-SCNC: 145 MMOL/L — SIGNIFICANT CHANGE UP (ref 135–145)
TROPONIN T SERPL-MCNC: 0.01 NG/ML — SIGNIFICANT CHANGE UP (ref 0–0.01)
WBC # BLD: 8.04 K/UL — SIGNIFICANT CHANGE UP (ref 3.8–10.5)
WBC # FLD AUTO: 8.04 K/UL — SIGNIFICANT CHANGE UP (ref 3.8–10.5)

## 2020-01-15 PROCEDURE — 88305 TISSUE EXAM BY PATHOLOGIST: CPT | Mod: 26

## 2020-01-15 PROCEDURE — 43247 EGD REMOVE FOREIGN BODY: CPT | Mod: GC

## 2020-01-15 PROCEDURE — 99233 SBSQ HOSP IP/OBS HIGH 50: CPT | Mod: GC

## 2020-01-15 PROCEDURE — 93010 ELECTROCARDIOGRAM REPORT: CPT | Mod: 76

## 2020-01-15 PROCEDURE — 43239 EGD BIOPSY SINGLE/MULTIPLE: CPT | Mod: GC

## 2020-01-15 RX ORDER — KETOROLAC TROMETHAMINE 30 MG/ML
15 SYRINGE (ML) INJECTION ONCE
Refills: 0 | Status: DISCONTINUED | OUTPATIENT
Start: 2020-01-15 | End: 2020-01-15

## 2020-01-15 RX ORDER — LISINOPRIL 2.5 MG/1
40 TABLET ORAL DAILY
Refills: 0 | Status: DISCONTINUED | OUTPATIENT
Start: 2020-01-15 | End: 2020-01-16

## 2020-01-15 RX ORDER — METHADONE HYDROCHLORIDE 40 MG/1
120 TABLET ORAL DAILY
Refills: 0 | Status: DISCONTINUED | OUTPATIENT
Start: 2020-01-16 | End: 2020-01-16

## 2020-01-15 RX ORDER — CARVEDILOL PHOSPHATE 80 MG/1
25 CAPSULE, EXTENDED RELEASE ORAL EVERY 12 HOURS
Refills: 0 | Status: DISCONTINUED | OUTPATIENT
Start: 2020-01-15 | End: 2020-01-16

## 2020-01-15 RX ORDER — METHADONE HYDROCHLORIDE 40 MG/1
110 TABLET ORAL ONCE
Refills: 0 | Status: DISCONTINUED | OUTPATIENT
Start: 2020-01-15 | End: 2020-01-15

## 2020-01-15 RX ORDER — METHADONE HYDROCHLORIDE 40 MG/1
12 TABLET ORAL
Qty: 0 | Refills: 0 | DISCHARGE
Start: 2020-01-15

## 2020-01-15 RX ORDER — PANTOPRAZOLE SODIUM 20 MG/1
1 TABLET, DELAYED RELEASE ORAL
Qty: 30 | Refills: 0
Start: 2020-01-15 | End: 2020-02-13

## 2020-01-15 RX ORDER — IBUPROFEN 200 MG
400 TABLET ORAL ONCE
Refills: 0 | Status: COMPLETED | OUTPATIENT
Start: 2020-01-15 | End: 2020-01-15

## 2020-01-15 RX ORDER — METHADONE HYDROCHLORIDE 40 MG/1
120 TABLET ORAL DAILY
Refills: 0 | Status: DISCONTINUED | OUTPATIENT
Start: 2020-01-15 | End: 2020-01-15

## 2020-01-15 RX ADMIN — METHADONE HYDROCHLORIDE 5 MILLIGRAM(S): 40 TABLET ORAL at 06:08

## 2020-01-15 RX ADMIN — PANTOPRAZOLE SODIUM 40 MILLIGRAM(S): 20 TABLET, DELAYED RELEASE ORAL at 22:43

## 2020-01-15 RX ADMIN — PANTOPRAZOLE SODIUM 40 MILLIGRAM(S): 20 TABLET, DELAYED RELEASE ORAL at 10:28

## 2020-01-15 RX ADMIN — Medication 15 MILLIGRAM(S): at 01:36

## 2020-01-15 RX ADMIN — METHADONE HYDROCHLORIDE 5 MILLIGRAM(S): 40 TABLET ORAL at 00:25

## 2020-01-15 RX ADMIN — Medication 15 MILLIGRAM(S): at 07:28

## 2020-01-15 RX ADMIN — Medication 15 MILLIGRAM(S): at 07:43

## 2020-01-15 RX ADMIN — Medication 15 MILLIGRAM(S): at 01:51

## 2020-01-15 RX ADMIN — METHADONE HYDROCHLORIDE 110 MILLIGRAM(S): 40 TABLET ORAL at 11:46

## 2020-01-15 NOTE — DISCHARGE NOTE PROVIDER - CARE PROVIDER_API CALL
Matt Holman  [address] [facility] [address]  Phone: [phone]  Fax: (   )    -  Follow Up Time: Matt Holman  [address] [facility] [address]  Phone: [phone]  Fax: (   )    -  Follow Up Time:     Varsha Pickering (DO)  Internal Medicine; Preventive Medicine  [address] [place] [address]  Phone: [phone]  Fax: [phone]  Follow Up Time:

## 2020-01-15 NOTE — PROGRESS NOTE ADULT - PROBLEM SELECTOR PLAN 1
Two day history of intermittent dysphagia with a sensation of fooding getting stuck in his throat after earing pork chops. ED ENT consult placed and negative scope. CXR and CT negative for foreign body. Pending EGD with GI    -EGD today  -Continue PPI BID  -Zofran for nausea  -Pending CT angio to rule out external compression or aneurysm  -Outpatient collateral pending  -RCRI 0%  -No history of reaction to anesthesia

## 2020-01-15 NOTE — DISCHARGE NOTE PROVIDER - NSDCMRMEDTOKEN_GEN_ALL_CORE_FT
aspirin 81 mg oral tablet: 1 tab(s) orally once a day  atorvastatin 20 mg oral tablet: 1 tab(s) orally once a day  carvedilol 25 mg oral tablet: 1 tab(s) orally 2 times a day  methadone 10 mg/mL oral concentrate: 12 milliliter(s) orally once a day  Protonix 40 mg oral delayed release tablet: 1 tab(s) orally once a day   quinapril 40 mg oral tablet: 1 tab(s) orally once a day  Symbicort 160 mcg-4.5 mcg/inh inhalation aerosol: 2 puff(s) inhaled 2 times a day  torsemide 10 mg oral tablet: 1 tab(s) orally once a day

## 2020-01-15 NOTE — DISCHARGE NOTE NURSING/CASE MANAGEMENT/SOCIAL WORK - NSDCFUADDAPPT_GEN_ALL_CORE_FT
Please continue seeing Dr. Holman as needed and try to see him within 4 weeks to make sure you have no further issues swallowing and to address the need for more Protonix.

## 2020-01-15 NOTE — DISCHARGE NOTE PROVIDER - NSDCFUADDAPPT_GEN_ALL_CORE_FT
Please continue seeing Dr. Holman as needed and try to see him within 4 weeks to make sure you have no further issues swallowing and to address the need for more Protonix. Please continue seeing Dr. Holman as needed and try to see him within 2 weeks to make sure you have no further issues swallowing and to address the need for more Protonix. Please continue seeing Dr. Holman as needed and try to see him within 2 weeks to make sure you have no further issues swallowing and to address the need for more Protonix.    You have an appointment with Dr. Pickering or her fellows on Please continue seeing Dr. Holman as needed and try to see him within 2 weeks to make sure you have no further issues swallowing and to address the need for more Protonix.    Dr. Pickering's office will be calling you to schedule an appointment once you are discharged. Please make sure you have an appointment with her and if you do not receive a call, please call the number we are providing you with this paperwork.

## 2020-01-15 NOTE — DISCHARGE NOTE PROVIDER - NSDCCPTREATMENT_GEN_ALL_CORE_FT
PRINCIPAL PROCEDURE  Procedure: CT neck  Findings and Treatment: IMPRESSION: No foreign body identified in the upper aerodigestive tract.      SECONDARY PROCEDURE  Procedure: EGD  Findings and Treatment: Food bolus found 33cm from incisors and removed.  Esophagitis and Gastritis PRINCIPAL PROCEDURE  Procedure: CT neck  Findings and Treatment: IMPRESSION: No foreign body identified in the upper aerodigestive tract.      SECONDARY PROCEDURE  Procedure: EGD  Findings and Treatment: Food bolus found 33cm from incisors and removed.  Esophagitis and Gastritis  Pathology:  Final Diagnosis  1. Stomach, antrum, biopsy:  - Gastric mucosa, antral type, with focal active gastritis,  cryptitis and crypt  architecture distortion  - Negative for Helicobacter species  2. Gastroesophageal junction, biopsy:  - Squamocolumnar junctional mucosa with active and chronic  inflammation  - Negative for intestinal metaplasia  3. Esophagus @ 38 cm, biopsy:  - Squamous epithelium with no significant histopathologic  findings  4. Esophagus, mid, biopsy:  - Squamous epithelium with no significant histopathologic  findings

## 2020-01-15 NOTE — PROGRESS NOTE ADULT - PROBLEM SELECTOR PLAN 6
F: None  E: replete K<4, Mg<2  N: NPO for EGD    VTE Prophylaxis: None needed  C: Full Code  D: ODALIS

## 2020-01-15 NOTE — PATIENT PROFILE ADULT - STATED REASON FOR ADMISSION
"I can't swallow at all. I ate something, ( a piece of pork Chop) andf it did not quite go down the way I wanted it to. I guess it was too  big."

## 2020-01-15 NOTE — PROGRESS NOTE ADULT - PROBLEM SELECTOR PLAN 2
BP >200/110 on admission with TWI on EKG that are now resolved and troponin .02 but now peaked    -Monitor blood pressure  -No need to trend cardiac enzymes any further

## 2020-01-15 NOTE — DISCHARGE NOTE NURSING/CASE MANAGEMENT/SOCIAL WORK - PATIENT PORTAL LINK FT
You can access the FollowMyHealth Patient Portal offered by [facility] by registering at the following website: [email or web address]. By joining [facility]’s FollowMyHealth portal, you will also be able to view your health information using other applications (apps) compatible with our system.

## 2020-01-15 NOTE — PATIENT PROFILE ADULT - NSPROCHRONICPAINLOC_GEN_A_NUR
second finger/ankle/lumbar spine/Bilateral:/upper/hand/knee/back/left upper arm/head/shoulder/third finger/elbow/wrist/thumb/fourth finger

## 2020-01-15 NOTE — DISCHARGE NOTE PROVIDER - HOSPITAL COURSE
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 ENT scope in ED negative. CT negative. Found to have dysphagia and hypertensive urgency. Admitted for EGD.        Problem List/Main Diagnoses (system-based):         1. Dysphagia - Patient presented with a 2 day history of dysphagia after eating pork chops. Seen in ED previously and improved therefore discharged but then returned with similar symptoms. Admitted for EGD with GI which demonstrated a food bolus impaction and esophagitis. The bolus was removed and the patient will take Protonix once a day.    2. Hypertension/Hypertensive Urgency - BP >200/110 on admission with TWI on EKG that are now resolved and troponin .02 but now peaked. Hypertension self resolved.    3. Abnormal EKG Changes - TWI of inferior leads and Q waves in V1-V3. No prior EKG to compare but AM with resolution of changes. Trop .02 on admission now peaked. No chest pain, dyspnea, orthopnea, or decreased exercise tolerance.    4. Chronic Pain - Patient on Methadone 120mg QD in the setting of a left upper extremity traumatic injury            Inpatient treatment course: Methadone, Ketorolac, X-ray, CT, EGD    New medications: Protonix once a day    Labs to be followed outpatient: None    Exam to be followed outpatient: None

## 2020-01-15 NOTE — DISCHARGE NOTE PROVIDER - PROVIDER TOKENS
FREE:[LAST:[Manda],FIRST:[Matt],PHONE:[phone]],FAX:[(   )    -],ADDRESS:[[address] [place] [address]]] FREE:[LAST:[Manda],FIRST:[Matt],PHONE:[phone]],FAX:[(   )    -],ADDRESS:[[address] [place] [address]]],PROVIDER:[TOKEN:[[id number]]]

## 2020-01-15 NOTE — PROGRESS NOTE ADULT - PROBLEM SELECTOR PLAN 3
Patient is on anti-hypertensives at home, but unsure of the names.    -Official med rec pending  -Labetalol PRN for BP >180/100

## 2020-01-15 NOTE — PROGRESS NOTE ADULT - SUBJECTIVE AND OBJECTIVE BOX
OVERNIGHT EVENTS: No acute events overnight.    SUBJECTIVE: Patient seen and examined at the bedside. He really would like to have his Methadone dose confirmed because he is having shoulder and left arm pain.    Vital Signs Last 12 Hrs  T(F): 98.3 (01-15-20 @ 05:34), Max: 98.6 (01-15-20 @ 01:25)  HR: 75 (01-15-20 @ 05:34) (75 - 93)  BP: 166/88 (01-15-20 @ 05:34) (122/88 - 166/88)  BP(mean): --  RR: 18 (01-15-20 @ 05:34) (18 - 18)  SpO2: 97% (01-15-20 @ 05:34) (97% - 98%)  I&O's Summary      PHYSICAL EXAM:  General: In no acute distress, resting comfortably in bed  HEENT: NCAT, PERRL, EOMI, no conjunctival pallor or scleral icterus, MMM. Speaking in full sentences, holding secretions  Neck: Supple, no JVD  Respiratory: Clear to auscultation bilaterally with no wheezes, rales, or rhonchi appreciated  Cardiovascular: RRR, normal S1 and S2, no murmurs, rubs, or gallops appreciated  Vascular: 2+ radial and DP pulses  Abdomen: Soft, NT/ND. Bowel sounds present in all four quadrants with no guarding, rebound tenderness, or palpable masses  Extremities: Warm and well perfused. LUE with post surgical changes and scar  Skin: No gross skin abnormalities or rashes noted other than as above  Neuro: AAOx3 with no cranial nerve deficits. Strength and sensation intact throughout.    LABS:                        13.6   8.04  )-----------( 146      ( 15 Jadon 2020 06:39 )             42.1     01-15    145  |  106  |  12  ----------------------------<  78  4.2   |  25  |  0.90    Ca    8.8      15 Jadon 2020 06:39  Mg     2.2     01-15    TPro  8.2  /  Alb  4.2  /  TBili  0.8  /  DBili  x   /  AST  31  /  ALT  17  /  AlkPhos  100  01-14    PT/INR - ( 15 Jadon 2020 06:39 )   PT: 13.3 sec;   INR: 1.16          PTT - ( 15 Jadon 2020 06:39 )  PTT:33.3 sec      RADIOLOGY & ADDITIONAL TESTS: Reviewed.    MEDICATIONS  (STANDING):  pantoprazole  Injectable 40 milliGRAM(s) IV Push every 12 hours    MEDICATIONS  (PRN):  ondansetron Injectable 4 milliGRAM(s) IV Push every 8 hours PRN Nausea and/or Vomiting      Allergies    No Known Allergies    Intolerances OVERNIGHT EVENTS: No acute events overnight.    SUBJECTIVE: Patient seen and examined at the bedside. He really would like to have his Methadone dose confirmed because he is having shoulder and left arm pain.    Vital Signs Last 12 Hrs  T(F): 98.3 (01-15-20 @ 05:34), Max: 98.6 (01-15-20 @ 01:25)  HR: 75 (01-15-20 @ 05:34) (75 - 93)  BP: 166/88 (01-15-20 @ 05:34) (122/88 - 166/88)  BP(mean): --  RR: 18 (01-15-20 @ 05:34) (18 - 18)  SpO2: 97% (01-15-20 @ 05:34) (97% - 98%)  I&O's Summary      PHYSICAL EXAM:  General: In no acute distress, resting comfortably in bed  HEENT: NCAT, PERRL, EOMI, no conjunctival pallor or scleral icterus, MMM. Speaking in full sentences, holding secretions  Neck: Supple, no JVD  Respiratory: Clear to auscultation bilaterally with no wheezes, rales, or rhonchi appreciated  Cardiovascular: RRR, normal S1 and S2, no murmurs, rubs, or gallops appreciated  Vascular: 2+ radial and DP pulses  Abdomen: Soft, NT/ND. Bowel sounds present in all four quadrants with no guarding, rebound tenderness, or palpable masses  Extremities: Warm and well perfused. LUE with post surgical changes and scar  Skin: No gross skin abnormalities or rashes noted other than as above  Neuro: AAOx3 with no cranial nerve deficits. Strength and sensation intact throughout.  Psych: normal affect  MSK: no joint swelling    LABS:                        13.6   8.04  )-----------( 146      ( 15 Jadon 2020 06:39 )             42.1     01-15    145  |  106  |  12  ----------------------------<  78  4.2   |  25  |  0.90    Ca    8.8      15 Jadon 2020 06:39  Mg     2.2     01-15    TPro  8.2  /  Alb  4.2  /  TBili  0.8  /  DBili  x   /  AST  31  /  ALT  17  /  AlkPhos  100  01-14    PT/INR - ( 15 Jadon 2020 06:39 )   PT: 13.3 sec;   INR: 1.16          PTT - ( 15 Jadon 2020 06:39 )  PTT:33.3 sec      RADIOLOGY & ADDITIONAL TESTS: Reviewed.    MEDICATIONS  (STANDING):  pantoprazole  Injectable 40 milliGRAM(s) IV Push every 12 hours    MEDICATIONS  (PRN):  ondansetron Injectable 4 milliGRAM(s) IV Push every 8 hours PRN Nausea and/or Vomiting      Allergies    No Known Allergies    Intolerances

## 2020-01-15 NOTE — DISCHARGE NOTE PROVIDER - NSDCCPCAREPLAN_GEN_ALL_CORE_FT
PRINCIPAL DISCHARGE DIAGNOSIS  Diagnosis: Dysphagia  Assessment and Plan of Treatment: You came in with some difficulty swallowing for 2 days for which you were previously seen in the ED. The episode self-resolved but you had symptoms later on and came back to the ED. You underwent an endoscopy with our GI department and they found that you indeed had a pork chop impaction. It was removed and you had some esophagitis from the pressure of the food. You will continue taking Protonix once a day for at least 1 month until you see your primary care provider      SECONDARY DISCHARGE DIAGNOSES  Diagnosis: Hypertensive urgency  Assessment and Plan of Treatment: When you came to the emergency department your blood pressure was very elevated. It ended up coming down without needing medications emergently and this was likely in part due to anxiety from the sensation you were having of not being able to swallow. You take multiple blood pressure medications at home and you can continue taking these medications once you are discharged. PRINCIPAL DISCHARGE DIAGNOSIS  Diagnosis: Dysphagia  Assessment and Plan of Treatment: You came in with some difficulty swallowing for 2 days for which you were previously seen in the ED. The episode self-resolved but you had symptoms later on and came back to the ED. You underwent an endoscopy with our GI department and they found that you indeed had a pork chop impaction. It was removed and you had some esophagitis from the pressure of the food. You will continue taking Protonix once a day for at least 1 month until you see your primary care provider  You will need to follow up with our gastroenterology department and an appointment will be made for you      SECONDARY DISCHARGE DIAGNOSES  Diagnosis: Obesity (BMI 35.0-39.9 without comorbidity)  Assessment and Plan of Treatment: We spoke at length about how weight can affect your overall health and that you are currently in the category of obese. You should continue to limit your intake and monitor your choice of meals. We spoke about healthy options and the benefits that a healthy diet and weight loss would provide for you long term.    Diagnosis: Hypertensive urgency  Assessment and Plan of Treatment: When you came to the emergency department your blood pressure was very elevated. It ended up coming down without needing medications emergently and this was likely in part due to anxiety from the sensation you were having of not being able to swallow. You take multiple blood pressure medications at home and you can continue taking these medications once you are discharged. PRINCIPAL DISCHARGE DIAGNOSIS  Diagnosis: Dysphagia  Assessment and Plan of Treatment: You came in with some difficulty swallowing for 2 days for which you were previously seen in the ED. The episode self-resolved but you had symptoms later on and came back to the ED. You underwent an endoscopy with our GI department and they found that you indeed had a pork chop impaction. It was removed and you had some esophagitis from the pressure of the food. You will continue taking Protonix once a day for at least 1 month until you see your primary care provider  You will need to follow up with our gastroenterology department and an appointment will be made for you      SECONDARY DISCHARGE DIAGNOSES  Diagnosis: Abnormal EKG  Assessment and Plan of Treatment: Your initial EKG was abnormal but a repeat EKG was normal. You should have another EKG done by your primary care provider within 2 weeks of discharge to make sure that it has remained normal.    Diagnosis: Hepatitis C  Assessment and Plan of Treatment: We routinely screen patients your age for Hepatitis C and you tested positive on our antibody test. You will need continued follow up for management of your Hepatitis C and you will be called by the GI clinic to schedule an appointment for you.    Diagnosis: Obesity (BMI 35.0-39.9 without comorbidity)  Assessment and Plan of Treatment: We spoke at length about how weight can affect your overall health and that you are currently in the category of obese. You should continue to limit your intake and monitor your choice of meals. We spoke about healthy options and the benefits that a healthy diet and weight loss would provide for you long term.    Diagnosis: Hypertensive urgency  Assessment and Plan of Treatment: When you came to the emergency department your blood pressure was very elevated. It ended up coming down without needing medications emergently and this was likely in part due to anxiety from the sensation you were having of not being able to swallow. You take multiple blood pressure medications at home and you can continue taking these medications once you are discharged.

## 2020-01-15 NOTE — PROGRESS NOTE ADULT - ASSESSMENT
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 ENT negative. CT negative. Pending EGD with GI.

## 2020-01-15 NOTE — PROGRESS NOTE ADULT - PROBLEM SELECTOR PLAN 5
Chronic pain of the LUE secondary to a traumatic injury. Takes 120mg Methadone with [facility] and dose has been confirmed.    -Continue Methadone 120mg QD

## 2020-01-15 NOTE — PROGRESS NOTE ADULT - PROBLEM SELECTOR PLAN 4
TWI of inferior leads and Q waves in V1-V3. No prior EKG to compare but AM with resolution of changes. Trop .02 on admission now peaked. No chest pain, dyspnea, orthopnea, or decreased exercise tolerance.    -EKG changed resolved, no need for further EKG  -Outpatient collateral pending

## 2020-01-16 VITALS
RESPIRATION RATE: 18 BRPM | TEMPERATURE: 98 F | HEART RATE: 82 BPM | OXYGEN SATURATION: 96 % | DIASTOLIC BLOOD PRESSURE: 82 MMHG | SYSTOLIC BLOOD PRESSURE: 136 MMHG

## 2020-01-16 PROBLEM — Z00.00 ENCOUNTER FOR PREVENTIVE HEALTH EXAMINATION: Status: ACTIVE | Noted: 2020-01-16

## 2020-01-16 LAB — SURGICAL PATHOLOGY STUDY: SIGNIFICANT CHANGE UP

## 2020-01-16 PROCEDURE — 88305 TISSUE EXAM BY PATHOLOGIST: CPT

## 2020-01-16 PROCEDURE — 86901 BLOOD TYPING SEROLOGIC RH(D): CPT

## 2020-01-16 PROCEDURE — 99239 HOSP IP/OBS DSCHRG MGMT >30: CPT

## 2020-01-16 PROCEDURE — 70360 X-RAY EXAM OF NECK: CPT

## 2020-01-16 PROCEDURE — 80048 BASIC METABOLIC PNL TOTAL CA: CPT

## 2020-01-16 PROCEDURE — 86850 RBC ANTIBODY SCREEN: CPT

## 2020-01-16 PROCEDURE — 80053 COMPREHEN METABOLIC PANEL: CPT

## 2020-01-16 PROCEDURE — 99285 EMERGENCY DEPT VISIT HI MDM: CPT | Mod: 25

## 2020-01-16 PROCEDURE — 85610 PROTHROMBIN TIME: CPT

## 2020-01-16 PROCEDURE — 87521 HEPATITIS C PROBE&RVRS TRNSC: CPT

## 2020-01-16 PROCEDURE — 96374 THER/PROPH/DIAG INJ IV PUSH: CPT

## 2020-01-16 PROCEDURE — 85025 COMPLETE CBC W/AUTO DIFF WBC: CPT

## 2020-01-16 PROCEDURE — 70490 CT SOFT TISSUE NECK W/O DYE: CPT

## 2020-01-16 PROCEDURE — 85027 COMPLETE CBC AUTOMATED: CPT

## 2020-01-16 PROCEDURE — 36415 COLL VENOUS BLD VENIPUNCTURE: CPT

## 2020-01-16 PROCEDURE — 83735 ASSAY OF MAGNESIUM: CPT

## 2020-01-16 PROCEDURE — 84484 ASSAY OF TROPONIN QUANT: CPT

## 2020-01-16 PROCEDURE — 96375 TX/PRO/DX INJ NEW DRUG ADDON: CPT

## 2020-01-16 PROCEDURE — 85730 THROMBOPLASTIN TIME PARTIAL: CPT

## 2020-01-16 PROCEDURE — 86803 HEPATITIS C AB TEST: CPT

## 2020-01-16 PROCEDURE — 93005 ELECTROCARDIOGRAM TRACING: CPT

## 2020-01-16 PROCEDURE — 82550 ASSAY OF CK (CPK): CPT

## 2020-01-16 PROCEDURE — 82553 CREATINE MB FRACTION: CPT

## 2020-01-16 PROCEDURE — 82962 GLUCOSE BLOOD TEST: CPT

## 2020-01-16 RX ORDER — BUDESONIDE AND FORMOTEROL FUMARATE DIHYDRATE 160; 4.5 UG/1; UG/1
2 AEROSOL RESPIRATORY (INHALATION)
Qty: 1 | Refills: 0
Start: 2020-01-16 | End: 2020-01-22

## 2020-01-16 RX ORDER — QUINAPRIL HYDROCHLORIDE 40 MG/1
1 TABLET, FILM COATED ORAL
Qty: 30 | Refills: 0
Start: 2020-01-16 | End: 2020-02-14

## 2020-01-16 RX ORDER — ATORVASTATIN CALCIUM 80 MG/1
1 TABLET, FILM COATED ORAL
Qty: 30 | Refills: 0
Start: 2020-01-16 | End: 2020-02-14

## 2020-01-16 RX ORDER — BUDESONIDE AND FORMOTEROL FUMARATE DIHYDRATE 160; 4.5 UG/1; UG/1
2 AEROSOL RESPIRATORY (INHALATION)
Qty: 0 | Refills: 0 | DISCHARGE

## 2020-01-16 RX ORDER — PANTOPRAZOLE SODIUM 20 MG/1
1 TABLET, DELAYED RELEASE ORAL
Qty: 30 | Refills: 0
Start: 2020-01-16 | End: 2020-02-14

## 2020-01-16 RX ORDER — ASPIRIN/CALCIUM CARB/MAGNESIUM 324 MG
1 TABLET ORAL
Qty: 30 | Refills: 0
Start: 2020-01-16 | End: 2020-02-14

## 2020-01-16 RX ORDER — ATORVASTATIN CALCIUM 80 MG/1
1 TABLET, FILM COATED ORAL
Qty: 7 | Refills: 0
Start: 2020-01-16 | End: 2020-01-22

## 2020-01-16 RX ORDER — PANTOPRAZOLE SODIUM 20 MG/1
1 TABLET, DELAYED RELEASE ORAL
Qty: 7 | Refills: 0
Start: 2020-01-16 | End: 2020-01-22

## 2020-01-16 RX ORDER — ATORVASTATIN CALCIUM 80 MG/1
1 TABLET, FILM COATED ORAL
Qty: 0 | Refills: 0 | DISCHARGE

## 2020-01-16 RX ORDER — QUINAPRIL HYDROCHLORIDE 40 MG/1
1 TABLET, FILM COATED ORAL
Qty: 0 | Refills: 0 | DISCHARGE

## 2020-01-16 RX ORDER — CARVEDILOL PHOSPHATE 80 MG/1
1 CAPSULE, EXTENDED RELEASE ORAL
Qty: 0 | Refills: 0 | DISCHARGE

## 2020-01-16 RX ORDER — QUINAPRIL HYDROCHLORIDE 40 MG/1
1 TABLET, FILM COATED ORAL
Qty: 7 | Refills: 0
Start: 2020-01-16 | End: 2020-01-22

## 2020-01-16 RX ORDER — CARVEDILOL PHOSPHATE 80 MG/1
1 CAPSULE, EXTENDED RELEASE ORAL
Qty: 14 | Refills: 0
Start: 2020-01-16 | End: 2020-01-22

## 2020-01-16 RX ORDER — ASPIRIN/CALCIUM CARB/MAGNESIUM 324 MG
1 TABLET ORAL
Qty: 0 | Refills: 0 | DISCHARGE

## 2020-01-16 RX ORDER — CARVEDILOL PHOSPHATE 80 MG/1
1 CAPSULE, EXTENDED RELEASE ORAL
Qty: 60 | Refills: 0
Start: 2020-01-16 | End: 2020-02-14

## 2020-01-16 RX ORDER — BUDESONIDE AND FORMOTEROL FUMARATE DIHYDRATE 160; 4.5 UG/1; UG/1
2 AEROSOL RESPIRATORY (INHALATION)
Qty: 1 | Refills: 0
Start: 2020-01-16 | End: 2020-02-14

## 2020-01-16 RX ORDER — ASPIRIN/CALCIUM CARB/MAGNESIUM 324 MG
1 TABLET ORAL
Qty: 7 | Refills: 0
Start: 2020-01-16 | End: 2020-01-22

## 2020-01-16 RX ADMIN — LISINOPRIL 40 MILLIGRAM(S): 2.5 TABLET ORAL at 00:12

## 2020-01-16 RX ADMIN — METHADONE HYDROCHLORIDE 120 MILLIGRAM(S): 40 TABLET ORAL at 11:41

## 2020-01-16 RX ADMIN — LISINOPRIL 40 MILLIGRAM(S): 2.5 TABLET ORAL at 06:29

## 2020-01-16 RX ADMIN — Medication 400 MILLIGRAM(S): at 00:12

## 2020-01-16 RX ADMIN — PANTOPRAZOLE SODIUM 40 MILLIGRAM(S): 20 TABLET, DELAYED RELEASE ORAL at 06:29

## 2020-01-16 RX ADMIN — Medication 400 MILLIGRAM(S): at 00:40

## 2020-01-16 RX ADMIN — CARVEDILOL PHOSPHATE 25 MILLIGRAM(S): 80 CAPSULE, EXTENDED RELEASE ORAL at 06:29

## 2020-01-16 NOTE — PROGRESS NOTE ADULT - PROBLEM SELECTOR PLAN 2
BP >200/110 on admission with TWI on EKG that are now resolved and troponin .02 but now peaked    -Monitor blood pressure  -No need to trend cardiac enzymes any further BP >200/110 on admission with TWI on EKG that are now resolved and troponin .02 but now peaked    -Restarted home anti-hypertensives

## 2020-01-16 NOTE — PROGRESS NOTE ADULT - ASSESSMENT
65 yo M with HTN, chronic pain, on methadone who comes in with two day history of dysphagia    1. Dysphagia - Patient with two days of dysphagia on presentation. S/p EGD on 1/15 with 5cm impacted food bolus s/p endoscopic removal. Patient found to have gastritis and duodenitis with some focal esophagitis likely secondary to the presence of said food bolus. Multiple biopsies obtained.  - Follow-up pathology  - Continue PPI once daily for 4-6 weeks  - Soft diet    Recommendations discussed with primary team  Case discussed with attending physician  Please recall as needed with any gastroenterological questions  Please schedule patient for follow-up at the [facility] on the fourth floor of [address] on Friday mornings with Dr. Pickering at [phone]

## 2020-01-16 NOTE — PROGRESS NOTE ADULT - PROBLEM SELECTOR PLAN 1
Two day history of intermittent dysphagia with a sensation of fooding getting stuck in his throat after earing pork chops. ED ENT consult placed and negative scope. CXR and CT negative for foreign body. Pending EGD with GI    -EGD today  -Continue PPI BID  -Zofran for nausea  -Pending CT angio to rule out external compression or aneurysm  -Outpatient collateral pending  -RCRI 0%  -No history of reaction to anesthesia Two day history of intermittent dysphagia with a sensation of fooding getting stuck in his throat after earing pork chops. ED ENT consult placed and negative scope. CXR and CT negative for foreign body.    - food impaction in esophagus on EGD - removed  - Continue PPI BID  -Zofran for nausea  -Pending CT angio to rule out external compression or aneurysm  -Outpatient collateral pending  -RCRI 0%  -No history of reaction to anesthesia Two day history of intermittent dysphagia with a sensation of fooding getting stuck in his throat after earing pork chops. ED ENT consult placed and negative scope. CXR and CT negative for foreign body.    - Food impaction in esophagus on EGD - removed  - Continue PO PPI for 4-6 weeks

## 2020-01-16 NOTE — PROGRESS NOTE ADULT - ASSESSMENT
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 ENT negative. CT negative. Pending EGD with GI. 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

## 2020-01-16 NOTE — PROGRESS NOTE ADULT - SUBJECTIVE AND OBJECTIVE BOX
OVERNIGHT EVENTS:    SUBJECTIVE: Patient seen and examined at the bedside.     Vital Signs Last 12 Hrs  T(F): 98.4 (01-16-20 @ 05:55), Max: 98.4 (01-15-20 @ 21:12)  HR: 74 (01-16-20 @ 05:55) (74 - 91)  BP: 110/68 (01-16-20 @ 05:55) (110/68 - 182/83)  BP(mean): --  RR: 18 (01-16-20 @ 05:55) (18 - 18)  SpO2: 97% (01-16-20 @ 05:55) (94% - 97%)  I&O's Summary      PHYSICAL EXAM:  General: In no acute distress, resting comfortably in bed  HEENT: NCAT, PERRL, EOMI, no conjunctival pallor or scleral icterus, MMM  Neck: Supple, no JVD  Respiratory: Clear to auscultation bilaterally with no wheezes, rales, or rhonchi appreciated  Cardiovascular: RRR, normal S1 and S2, no murmurs, rubs, or gallops appreciated  Vascular: 2+ radial and DP pulses  Abdomen: Soft, NT/ND. Bowel sounds present in all four quadrants with no guarding, rebound tenderness, or palpable masses  Extremities: Warm and well perfused. No clubbing, cyanosis, or edema noted  Skin: No gross skin abnormalities or rashes noted  Neuro: AAOx3 with no cranial nerve deficits. Strength and sensation intact throughout.    LABS:                        13.6   8.04  )-----------( 146      ( 15 Jadon 2020 06:39 )             42.1     01-15    145  |  106  |  12  ----------------------------<  78  4.2   |  25  |  0.90    Ca    8.8      15 Jadon 2020 06:39  Mg     2.2     01-15    TPro  8.2  /  Alb  4.2  /  TBili  0.8  /  DBili  x   /  AST  31  /  ALT  17  /  AlkPhos  100  01-14    PT/INR - ( 15 Jadon 2020 06:39 )   PT: 13.3 sec;   INR: 1.16          PTT - ( 15 Jadon 2020 06:39 )  PTT:33.3 sec      RADIOLOGY & ADDITIONAL TESTS: Reviewed.    MEDICATIONS  (STANDING):  carvedilol 25 milliGRAM(s) Oral every 12 hours  lisinopril 40 milliGRAM(s) Oral daily  methadone  Concentrate 120 milliGRAM(s) Oral daily  pantoprazole  Injectable 40 milliGRAM(s) IV Push every 12 hours    MEDICATIONS  (PRN):  ondansetron Injectable 4 milliGRAM(s) IV Push every 8 hours PRN Nausea and/or Vomiting      Allergies    No Known Allergies    Intolerances OVERNIGHT EVENTS: none    SUBJECTIVE: Patient seen and examined at the bedside. Patient is feeling much better, is able to tolerate liquid diet and is looking forward to eating his breakfast.  Has not had a BM in over 4 days, but has not been able to tolerate food until yesterday post-procedure. Denies fevers/chills, headache, vision changes, SOB, chest pain, abdominal pain, LUTS, edema.    Vital Signs Last 12 Hrs  T(F): 98.4 (01-16-20 @ 05:55), Max: 98.4 (01-15-20 @ 21:12)  HR: 74 (01-16-20 @ 05:55) (74 - 91)  BP: 110/68 (01-16-20 @ 05:55) (110/68 - 182/83)  BP(mean): --  RR: 18 (01-16-20 @ 05:55) (18 - 18)  SpO2: 97% (01-16-20 @ 05:55) (94% - 97%)  I&O's Summary      PHYSICAL EXAM:  General: In no acute distress, resting comfortably in bed  HEENT: NCAT, PERRL, EOMI, no conjunctival pallor or scleral icterus, MMM  Neck: Supple, no JVD  Respiratory: Clear to auscultation bilaterally with slight expiratory wheezes, no rales or rhonchi appreciated  Cardiovascular: RRR, normal S1 and S2, no murmurs, rubs, or gallops appreciated  Vascular: 2+ radial and DP pulses  Abdomen: Soft, NT/ND. Bowel sounds present in all four quadrants with no guarding, rebound tenderness, or palpable masses  Extremities: Warm and well perfused. No clubbing, cyanosis, or edema noted  Skin: No gross skin abnormalities or rashes noted  Neuro: AAOx3 with no cranial nerve deficits. Strength and sensation intact throughout, except LUE (due to past trauma).    LABS:                        13.6   8.04  )-----------( 146      ( 15 Jadon 2020 06:39 )             42.1     01-15    145  |  106  |  12  ----------------------------<  78  4.2   |  25  |  0.90    Ca    8.8      15 Jadon 2020 06:39  Mg     2.2     01-15    TPro  8.2  /  Alb  4.2  /  TBili  0.8  /  DBili  x   /  AST  31  /  ALT  17  /  AlkPhos  100  01-14    PT/INR - ( 15 Jadon 2020 06:39 )   PT: 13.3 sec;   INR: 1.16          PTT - ( 15 Jadon 2020 06:39 )  PTT:33.3 sec      RADIOLOGY & ADDITIONAL TESTS: Reviewed.    MEDICATIONS  (STANDING):  carvedilol 25 milliGRAM(s) Oral every 12 hours  lisinopril 40 milliGRAM(s) Oral daily  methadone  Concentrate 120 milliGRAM(s) Oral daily  pantoprazole  Injectable 40 milliGRAM(s) IV Push every 12 hours    MEDICATIONS  (PRN):  ondansetron Injectable 4 milliGRAM(s) IV Push every 8 hours PRN Nausea and/or Vomiting      Allergies    No Known Allergies    Intolerances

## 2020-01-16 NOTE — PROGRESS NOTE ADULT - PROBLEM SELECTOR PLAN 4
TWI of inferior leads and Q waves in V1-V3. No prior EKG to compare but AM with resolution of changes. Trop .02 on admission now peaked. No chest pain, dyspnea, orthopnea, or decreased exercise tolerance.    -EKG changed resolved, no need for further EKG  -Outpatient collateral pending TWI of inferior leads and Q waves in V1-V3. No prior EKG to compare but AM with resolution of changes. Trop .02 on admission now peaked. No chest pain, dyspnea, orthopnea, or decreased exercise tolerance.    -EKG changed resolved, no need for further EKG

## 2020-01-16 NOTE — PROGRESS NOTE ADULT - SUBJECTIVE AND OBJECTIVE BOX
GASTROENTEROLOGY PROGRESS NOTE  Patient seen and examined at bedside. Patient with greatly improved PO tolerance this morning. No dysphagia or odynophagia. Patient endorsing headache and chest pain overnight which have resolved.    PERTINENT REVIEW OF SYSTEMS:  CONSTITUTIONAL: No weakness, fevers or chills  HEENT: No visual changes; No vertigo or throat pain   GASTROINTESTINAL: As above  NEUROLOGICAL: No numbness or weakness  SKIN: No itching, burning, rashes, or lesions     Allergies    No Known Allergies    Intolerances      MEDICATIONS:  MEDICATIONS  (STANDING):  carvedilol 25 milliGRAM(s) Oral every 12 hours  lisinopril 40 milliGRAM(s) Oral daily  methadone  Concentrate 120 milliGRAM(s) Oral daily  pantoprazole  Injectable 40 milliGRAM(s) IV Push every 12 hours    MEDICATIONS  (PRN):  ondansetron Injectable 4 milliGRAM(s) IV Push every 8 hours PRN Nausea and/or Vomiting    Vital Signs Last 24 Hrs  T(C): 36.9 (16 Jan 2020 05:55), Max: 36.9 (15 Jadon 2020 21:12)  T(F): 98.4 (16 Jan 2020 05:55), Max: 98.4 (15 Jadon 2020 21:12)  HR: 74 (16 Jan 2020 05:55) (74 - 91)  BP: 110/68 (16 Jan 2020 05:55) (110/68 - 182/83)  BP(mean): --  RR: 18 (16 Jan 2020 05:55) (17 - 18)  SpO2: 97% (16 Jan 2020 05:55) (93% - 97%)    PHYSICAL EXAM:    General: Well developed; well nourished; in no acute distress  HEENT: MMM, conjunctiva and sclera clear  Gastrointestinal: Soft non-tender non-distended; Normal bowel sounds; No hepatosplenomegaly. No rebound or guarding  Skin: Warm and dry. No obvious rash    LABS:                        13.6   8.04  )-----------( 146      ( 15 Jadon 2020 06:39 )             42.1     01-15    145  |  106  |  12  ----------------------------<  78  4.2   |  25  |  0.90    Ca    8.8      15 Jadon 2020 06:39  Mg     2.2     01-15    TPro  8.2  /  Alb  4.2  /  TBili  0.8  /  DBili  x   /  AST  31  /  ALT  17  /  AlkPhos  100  01-14    PT/INR - ( 15 Jadon 2020 06:39 )   PT: 13.3 sec;   INR: 1.16          PTT - ( 15 Jadon 2020 06:39 )  PTT:33.3 sec                  RADIOLOGY & ADDITIONAL STUDIES:  Reviewed

## 2020-01-16 NOTE — PROGRESS NOTE ADULT - PROBLEM SELECTOR PLAN 3
Patient is on anti-hypertensives at home, but unsure of the names.    -Official med rec pending  -Labetalol PRN for BP >180/100 Patient is on anti-hypertensives at home, but unsure of the names.    -Restarted home anti-hypertensives

## 2020-01-18 DIAGNOSIS — T17.320A FOOD IN LARYNX CAUSING ASPHYXIATION, INITIAL ENCOUNTER: ICD-10-CM
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
## 2020-01-27 DIAGNOSIS — I16.0 HYPERTENSIVE URGENCY: ICD-10-CM

## 2020-01-27 DIAGNOSIS — Z87.891 PERSONAL HISTORY OF NICOTINE DEPENDENCE: ICD-10-CM

## 2020-01-27 DIAGNOSIS — K29.70 GASTRITIS, UNSPECIFIED, WITHOUT BLEEDING: ICD-10-CM

## 2020-01-27 DIAGNOSIS — T18.128A FOOD IN ESOPHAGUS CAUSING OTHER INJURY, INITIAL ENCOUNTER: ICD-10-CM
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
## 2020-01-27 DIAGNOSIS — E66.9 OBESITY, UNSPECIFIED: ICD-10-CM

## 2020-01-27 DIAGNOSIS — R13.10 DYSPHAGIA, UNSPECIFIED: ICD-10-CM

## 2020-01-27 DIAGNOSIS — G89.29 OTHER CHRONIC PAIN: ICD-10-CM

## 2020-01-27 DIAGNOSIS — K20.8 OTHER ESOPHAGITIS: ICD-10-CM

## 2020-01-27 DIAGNOSIS — R94.31 ABNORMAL ELECTROCARDIOGRAM [ECG] [EKG]: ICD-10-CM

## 2020-01-27 DIAGNOSIS — K29.80 DUODENITIS WITHOUT BLEEDING: ICD-10-CM
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
## 2020-03-30 ENCOUNTER — APPOINTMENT (OUTPATIENT)
Dept: GASTROENTEROLOGY | Facility: CLINIC | Age: 65
End: 2020-03-30

## 2020-07-16 NOTE — PATIENT PROFILE ADULT - PRO INTERPRETER NEED 2
Bilateral digital diagnostic mammogram and right breast ultrasound

 

Reason for examination: History of right upper outer breast pain. No 

breast lump or any other breast symptoms. History of benign right upper 

outer breast surgical excisional biopsy 2007.

 

Comparison is made to previous study dated mammogram August 13, 2014 and 

[facility]

 

Routine CC and MLO digital views obtained. Interpretation was made with 

the benefit of CAD.

 

Mammogram: The skin and nipples show no abnormalities. No abnormal lymph 

nodes are seen. The breast parenchyma is predominantly fatty. (Breast 

density: Category A.) There are no suspicious masses, suspicious 

calcifications or architectural distortions.

 

ULTRASOUND: Normal appearing breast tissue, no suspicious abnormality 

including no cystic lesion, solid mass, architectural distortion or 

shadowing at the right upper outer breast area pain. No axillary 

adenopathy.

 

Impression: Normal bilateral mammogram and right breast ultrasound. The 

patient's right breast symptoms be followed clinically. Continue routine 

mammographic screening.

 

BI-RADS Category 1:  Negative.

 

"Our facility is accredited by the [employer] 

Mammography Program."

 

This patient's information has been entered into a reminder system for the

patient to be notified with the results of her examination and a target 

date for the next mammogram.

 

Electronically signed by: Néstor Lockhart MD (7/16/2020 9:46 AM) [email or web address]
Bilateral digital diagnostic mammogram and right breast ultrasound

 

Reason for examination: History of right upper outer breast pain. No 

breast lump or any other breast symptoms. History of benign right upper 

outer breast surgical excisional biopsy 2007.

 

Comparison is made to previous study dated mammogram August 13, 2014 and 

[facility]

 

Routine CC and MLO digital views obtained. Interpretation was made with 

the benefit of CAD.

 

Mammogram: The skin and nipples show no abnormalities. No abnormal lymph 

nodes are seen. The breast parenchyma is predominantly fatty. (Breast 

density: Category A.) There are no suspicious masses, suspicious 

calcifications or architectural distortions.

 

ULTRASOUND: Normal appearing breast tissue, no suspicious abnormality 

including no cystic lesion, solid mass, architectural distortion or 

shadowing at the right upper outer breast area pain. No axillary 

adenopathy.

 

Impression: Normal bilateral mammogram and right breast ultrasound. The 

patient's right breast symptoms be followed clinically. Continue routine 

mammographic screening.

 

BI-RADS Category 1:  Negative.

 

"Our facility is accredited by the [employer] 

Mammography Program."

 

This patient's information has been entered into a reminder system for the

patient to be notified with the results of her examination and a target 

date for the next mammogram.

 

Electronically signed by: Néstor Lockhart MD (7/16/2020 9:46 AM) [email or web address]
English

## 2022-02-27 NOTE — ED ADULT TRIAGE NOTE - NS ED TRIAGE AVPU SCALE
Alert-The patient is alert, awake and responds to voice. The patient is oriented to time, place, and person. The triage nurse is able to obtain subjective information. 4 weeks

## 2024-08-21 NOTE — PATIENT PROFILE ADULT - FUNCTIONAL SCREEN CURRENT LEVEL: BATHING, MLM
Plan: Happy with results \\nStay on Finacea
Render In Strict Bullet Format?: No
Detail Level: Zone
Continue Regimen: Finacea qd to bid as needed
0 = independent